# Patient Record
Sex: FEMALE | Race: ASIAN | NOT HISPANIC OR LATINO
[De-identification: names, ages, dates, MRNs, and addresses within clinical notes are randomized per-mention and may not be internally consistent; named-entity substitution may affect disease eponyms.]

---

## 2021-09-21 ENCOUNTER — NON-APPOINTMENT (OUTPATIENT)
Age: 56
End: 2021-09-21

## 2021-11-18 PROBLEM — Z00.00 ENCOUNTER FOR PREVENTIVE HEALTH EXAMINATION: Status: ACTIVE | Noted: 2021-11-18

## 2021-12-03 ENCOUNTER — APPOINTMENT (OUTPATIENT)
Dept: ENDOCRINOLOGY | Facility: CLINIC | Age: 56
End: 2021-12-03
Payer: MEDICAID

## 2021-12-03 VITALS
SYSTOLIC BLOOD PRESSURE: 125 MMHG | DIASTOLIC BLOOD PRESSURE: 72 MMHG | WEIGHT: 91 LBS | HEIGHT: 62 IN | HEART RATE: 80 BPM | BODY MASS INDEX: 16.75 KG/M2

## 2021-12-03 DIAGNOSIS — Z83.511 FAMILY HISTORY OF GLAUCOMA: ICD-10-CM

## 2021-12-03 DIAGNOSIS — H40.9 UNSPECIFIED GLAUCOMA: ICD-10-CM

## 2021-12-03 PROCEDURE — 99204 OFFICE O/P NEW MOD 45 MIN: CPT

## 2021-12-04 ENCOUNTER — TRANSCRIPTION ENCOUNTER (OUTPATIENT)
Age: 56
End: 2021-12-04

## 2021-12-06 ENCOUNTER — APPOINTMENT (OUTPATIENT)
Dept: ULTRASOUND IMAGING | Facility: HOSPITAL | Age: 56
End: 2021-12-06

## 2021-12-06 ENCOUNTER — OUTPATIENT (OUTPATIENT)
Dept: OUTPATIENT SERVICES | Facility: HOSPITAL | Age: 56
LOS: 1 days | End: 2021-12-06
Payer: COMMERCIAL

## 2021-12-06 ENCOUNTER — RESULT REVIEW (OUTPATIENT)
Age: 56
End: 2021-12-06

## 2021-12-06 LAB
25(OH)D3 SERPL-MCNC: 41.5 NG/ML
ALBUMIN SERPL ELPH-MCNC: 4.7 G/DL
ALP BLD-CCNC: 83 U/L
ALT SERPL-CCNC: 58 U/L
ANION GAP SERPL CALC-SCNC: 14 MMOL/L
AST SERPL-CCNC: 59 U/L
BASOPHILS # BLD AUTO: 0.06 K/UL
BASOPHILS NFR BLD AUTO: 0.8 %
BILIRUB SERPL-MCNC: 0.2 MG/DL
BUN SERPL-MCNC: 24 MG/DL
CALCIUM SERPL-MCNC: 9.8 MG/DL
CALCIUM SERPL-MCNC: 9.8 MG/DL
CHLORIDE SERPL-SCNC: 100 MMOL/L
CHOLEST SERPL-MCNC: 163 MG/DL
CO2 SERPL-SCNC: 25 MMOL/L
CREAT SERPL-MCNC: 0.72 MG/DL
EOSINOPHIL # BLD AUTO: 0.07 K/UL
EOSINOPHIL NFR BLD AUTO: 1 %
ESTIMATED AVERAGE GLUCOSE: 108 MG/DL
GLUCOSE SERPL-MCNC: 101 MG/DL
HBA1C MFR BLD HPLC: 5.4 %
HCT VFR BLD CALC: 41.2 %
HDLC SERPL-MCNC: 69 MG/DL
HGB BLD-MCNC: 12.8 G/DL
IMM GRANULOCYTES NFR BLD AUTO: 0.4 %
LDLC SERPL CALC-MCNC: 76 MG/DL
LYMPHOCYTES # BLD AUTO: 1.93 K/UL
LYMPHOCYTES NFR BLD AUTO: 26.5 %
MAGNESIUM SERPL-MCNC: 2.2 MG/DL
MAN DIFF?: NORMAL
MCHC RBC-ENTMCNC: 30.3 PG
MCHC RBC-ENTMCNC: 31.1 GM/DL
MCV RBC AUTO: 97.4 FL
MONOCYTES # BLD AUTO: 0.43 K/UL
MONOCYTES NFR BLD AUTO: 5.9 %
NEUTROPHILS # BLD AUTO: 4.75 K/UL
NEUTROPHILS NFR BLD AUTO: 65.4 %
NONHDLC SERPL-MCNC: 95 MG/DL
PARATHYROID HORMONE INTACT: 17 PG/ML
PHOSPHATE SERPL-MCNC: 4.1 MG/DL
PLATELET # BLD AUTO: 338 K/UL
POTASSIUM SERPL-SCNC: 5 MMOL/L
PROT SERPL-MCNC: 8 G/DL
RBC # BLD: 4.23 M/UL
RBC # FLD: 12.8 %
SODIUM SERPL-SCNC: 139 MMOL/L
T4 FREE SERPL-MCNC: 1.6 NG/DL
THYROGLOB AB SERPL-ACNC: <20 IU/ML
THYROGLOB SERPL-MCNC: <0.2 NG/ML
TRIGL SERPL-MCNC: 94 MG/DL
TSH SERPL-ACNC: 0.89 UIU/ML
VIT B12 SERPL-MCNC: 1567 PG/ML
WBC # FLD AUTO: 7.27 K/UL

## 2021-12-06 PROCEDURE — 76536 US EXAM OF HEAD AND NECK: CPT

## 2021-12-06 PROCEDURE — 76536 US EXAM OF HEAD AND NECK: CPT | Mod: 26

## 2022-02-02 LAB
ALBUMIN SERPL ELPH-MCNC: 4.8 G/DL
ALP BLD-CCNC: 80 U/L
ALT SERPL-CCNC: 67 U/L
ANION GAP SERPL CALC-SCNC: 13 MMOL/L
AST SERPL-CCNC: 53 U/L
BILIRUB SERPL-MCNC: 0.2 MG/DL
BUN SERPL-MCNC: 25 MG/DL
CALCIUM SERPL-MCNC: 9.7 MG/DL
CALCIUM SERPL-MCNC: 9.7 MG/DL
CHLORIDE SERPL-SCNC: 102 MMOL/L
CO2 SERPL-SCNC: 26 MMOL/L
CREAT SERPL-MCNC: 0.78 MG/DL
GLUCOSE SERPL-MCNC: 99 MG/DL
MAGNESIUM SERPL-MCNC: 2 MG/DL
PARATHYROID HORMONE INTACT: 36 PG/ML
PHOSPHATE SERPL-MCNC: 4.7 MG/DL
POTASSIUM SERPL-SCNC: 5.3 MMOL/L
PROT SERPL-MCNC: 7.8 G/DL
SODIUM SERPL-SCNC: 141 MMOL/L
TSH SERPL-ACNC: 0.3 UIU/ML

## 2022-02-02 NOTE — ADDENDUM
[FreeTextEntry1] : Recent laboratory results as below. AST and ALT borderline elevated and recommend follow-up with primary care. TSH within range; we will plan to adjust levothyroxine to 88 mcg daily and repeat TSH in 8 weeks. Thyroglobulin undetectable with negative antibodies, which is reassuring. Serum calcium within range and can discontinue alfacalcidol for now, with plan for repeat calciotropic panel with next blood tests. I advised she call me immediately with any symptoms of hypocalcemia, including perioral or extremity numbness/tingling. Lipid panel within range and can continue rosuvastatin 5 mg daily. Vitamin B12 borderline elevated and recommended decrease in supplementation. Other test results within range. 12/06/21\par \par Recent neck ultrasound as below, without evidence of residual or recurrent disease; discussed with Ms. Marie. 12/06/21\par \par Recent laboratory results as below; discussed with Ms. Marie. Serum calcium and PTH within range. Phosphorus elevated, likely due to dehydration. We can repeat with next blood tests. TSH at the lower end of normal and recommend adjustment in levothyroxine from 88 mcg daily to 88 mcg, 1 pill 6 days/week and 0.5 pill 1 day/week (average daily dose 82 mcg). I advised she make an appointment to see me in May 2022, with interval blood tests at that time. 2/02/22

## 2022-02-02 NOTE — PHYSICAL EXAM
[Alert] : alert [Healthy Appearance] : healthy appearance [No Acute Distress] : no acute distress [Normal Sclera/Conjunctiva] : normal sclera/conjunctiva [Normal Hearing] : hearing was normal [No Neck Mass] : no neck mass was observed [No LAD] : no lymphadenopathy [Supple] : the neck was supple [Well Healed Scar] : well healed scar [No Respiratory Distress] : no respiratory distress [Clear to Auscultation] : lungs were clear to auscultation bilaterally [Normal S1, S2] : normal S1 and S2 [Normal Rate] : heart rate was normal [Regular Rhythm] : with a regular rhythm [No Stigmata of Cushings Syndrome] : no stigmata of Cushings Syndrome [Normal Gait] : normal gait [Normal Insight/Judgement] : insight and judgment were intact [Kyphosis] : no kyphosis present [Acanthosis Nigricans] : no acanthosis nigricans [de-identified] : no palpable thyroid tissue [de-identified] : no moon facies, no supraclavicular fat pads

## 2022-02-02 NOTE — ASSESSMENT
[FreeTextEntry1] : Thyroid cancer. Stage 1 due to age and absence of metastatic disease per her report; pathology not available. She is status post total thyroidectomy in October 2017. She was treated with radioactive iodine postoperatively, unknown dose. She has had an excellent response to therapy per her report. She is taking levothyroxine at bedtime and can continue if her thyroid stimulating hormone level remains within range.\par Consider adjustment in levothyroxine to 88 mcg daily pending TSH for goal 0.5-2.0 uIU/mL\par Check thyroglobulin and antibodies\par Interval neck ultrasound\par \par Postoperative hypocalcemia. She has been taking alfacalcidol 0.5 mcg daily since her thyroid surgery due to immediate postoperative hypocalcemia, approximately equivalent to calcitriol 0.25 mcg. She was not informed of a diagnosis of hypoparathyroidism. No history of kidney stone or fracture.\par Check calciotropic panel; consider discontinuation of active vitamin D therapy if serum calcium is robust\par \par Hyperlipidemia. She has been taking rosuvastatin 5 mg daily and tolerating well. No personal or family history of heart disease.\par Continue rosuvastatin 5 mg daily pending lipid panel\par \par I referred her to primary care providers within the [facility] system.\par \par Return to see me in 6 months or earlier as needed.

## 2022-02-02 NOTE — HISTORY OF PRESENT ILLNESS
[FreeTextEntry1] : Ms. Marie is a 56 year-old woman with a history of thyroid cancer, hypocalcemia, and hyperlipidemia presenting to establish care with me. \par \par Thyroid cancer. Stage 1 due to age and absence of metastatic disease per her report; pathology not available. \par She is status post total thyroidectomy in October 2017. \par She was treated with radioactive iodine postoperatively, perhaps 30 mCi.\par Neck ultrasound monitoring in [place] has been without evidence of residual or recurrent disease per her report.\par She has been taking levothyroxine 50 mcg, 2 tablets 5 days/week and 1 tablet 2 days/week (average daily dose 86 mcg).\par She takes levothyroxine before bed.\par No history of external beam radiation exposure.\par Mother with history of hyperthyroidism with subsequent postablation hypothyroidism. No family history of thyroid cancer or other endocrine tumors.\par \par Postoperative hypocalcemia.\par She has been taking alfacalcidol 0.5 mcg daily since her thyroid surgery due to immediate postoperative hypocalcemia, approximately equivalent to calcitriol 0.25 mcg. \par She was not informed of a diagnosis of hypoparathyroidism.\par No history of kidney stone or fracture.\par She has yogurt, cottage cheese, and milk daily. \par No calcium supplements, multivitamin, or vitamin D supplements.\par \par Hyperlipidemia.\par She has been taking rosuvastatin 5 mg daily since 2019.\par She is tolerating therapy well.\par No personal or family history of heart disease.\par \par She used to travel back and forth to [facility] for medical care but has not been able to travel during the pandemic.

## 2022-05-03 ENCOUNTER — APPOINTMENT (OUTPATIENT)
Dept: ENDOCRINOLOGY | Facility: CLINIC | Age: 57
End: 2022-05-03
Payer: MEDICAID

## 2022-05-03 VITALS
SYSTOLIC BLOOD PRESSURE: 98 MMHG | BODY MASS INDEX: 17.85 KG/M2 | HEIGHT: 62 IN | HEART RATE: 75 BPM | DIASTOLIC BLOOD PRESSURE: 63 MMHG | WEIGHT: 97 LBS

## 2022-05-03 PROCEDURE — 99214 OFFICE O/P EST MOD 30 MIN: CPT

## 2022-05-04 ENCOUNTER — TRANSCRIPTION ENCOUNTER (OUTPATIENT)
Age: 57
End: 2022-05-04

## 2022-05-04 ENCOUNTER — NON-APPOINTMENT (OUTPATIENT)
Age: 57
End: 2022-05-04

## 2022-05-04 LAB
25(OH)D3 SERPL-MCNC: 27.4 NG/ML
ALBUMIN SERPL ELPH-MCNC: 5.1 G/DL
ALP BLD-CCNC: 77 U/L
ALT SERPL-CCNC: 78 U/L
ANION GAP SERPL CALC-SCNC: 12 MMOL/L
AST SERPL-CCNC: 61 U/L
BASOPHILS # BLD AUTO: 0.05 K/UL
BASOPHILS NFR BLD AUTO: 0.6 %
BILIRUB SERPL-MCNC: 0.2 MG/DL
BUN SERPL-MCNC: 25 MG/DL
CALCIUM SERPL-MCNC: 9.8 MG/DL
CALCIUM SERPL-MCNC: 9.8 MG/DL
CHLORIDE SERPL-SCNC: 101 MMOL/L
CHOLEST SERPL-MCNC: 171 MG/DL
CO2 SERPL-SCNC: 27 MMOL/L
CREAT SERPL-MCNC: 0.7 MG/DL
EGFR: 101 ML/MIN/1.73M2
EOSINOPHIL # BLD AUTO: 0.09 K/UL
EOSINOPHIL NFR BLD AUTO: 1.1 %
GLUCOSE SERPL-MCNC: 85 MG/DL
HCT VFR BLD CALC: 40.6 %
HDLC SERPL-MCNC: 76 MG/DL
HGB BLD-MCNC: 12.6 G/DL
IMM GRANULOCYTES NFR BLD AUTO: 0.4 %
LDLC SERPL CALC-MCNC: 70 MG/DL
LYMPHOCYTES # BLD AUTO: 2.02 K/UL
LYMPHOCYTES NFR BLD AUTO: 25.6 %
MAGNESIUM SERPL-MCNC: 2 MG/DL
MAN DIFF?: NORMAL
MCHC RBC-ENTMCNC: 29.6 PG
MCHC RBC-ENTMCNC: 31 GM/DL
MCV RBC AUTO: 95.3 FL
MONOCYTES # BLD AUTO: 0.55 K/UL
MONOCYTES NFR BLD AUTO: 7 %
NEUTROPHILS # BLD AUTO: 5.15 K/UL
NEUTROPHILS NFR BLD AUTO: 65.3 %
NONHDLC SERPL-MCNC: 95 MG/DL
PARATHYROID HORMONE INTACT: 23 PG/ML
PHOSPHATE SERPL-MCNC: 4.4 MG/DL
PLATELET # BLD AUTO: 283 K/UL
POTASSIUM SERPL-SCNC: 4.7 MMOL/L
PROT SERPL-MCNC: 7.8 G/DL
RBC # BLD: 4.26 M/UL
RBC # FLD: 13.2 %
SODIUM SERPL-SCNC: 140 MMOL/L
TRIGL SERPL-MCNC: 125 MG/DL
TSH SERPL-ACNC: 0.42 UIU/ML
WBC # FLD AUTO: 7.89 K/UL

## 2022-05-04 NOTE — ADDENDUM
[FreeTextEntry1] : Recent laboratory results as below; discussed with Ms. Marie. Blood urea nitrogen and albumin borderline elevated; recommended hydration. AST and ALT borderline elevated as per previous and recommend referral to a gastroenterologist. TSH within range. Lipid panel around goal. Vitamin D below goal and recommend an over-the-counter supplement with 1000 intl units daily. Other test results within range. 5/04/22

## 2022-05-04 NOTE — ASSESSMENT
[FreeTextEntry1] : Thyroid cancer. Stage 1 due to age and absence of metastatic disease per her report; pathology not available. She is status post total thyroidectomy in October 2017. She was treated with radioactive iodine postoperatively, unknown dose. She has had an excellent response to therapy. Thyroglobulin undetectable with negative antibodies in December 2021. Neck ultrasound in December 2021 without evidence of residual or recurrent disease. \par Consider levothyroxine 88 mcg, 1 pill 6 days/week and 0.5 pill 1 day/week pending TSH for goal 0.5-2.0 uIU/mL\par Monitor thyroglobulin and antibodies in December 2022\par Interval neck ultrasound in December 2022\par \par Postoperative hypocalcemia. She had been taking alfacalcidol 0.5 mcg daily since her thyroid surgery due to immediate postoperative hypocalcemia, approximately equivalent to calcitriol 0.25 mcg; we discontinued therapy in December 2021. No history of kidney stone or fracture. No symptoms of hypocalcemia.\par Monitor calciotropic panel\par \par Hyperlipidemia. She has been taking rosuvastatin 5 mg daily and tolerating well. No personal or family history of heart disease.\par Continue rosuvastatin 5 mg daily pending lipid panel\par \par I referred her to primary care providers within the [facility] system.\par \par Return to see me in 6 months or earlier as needed.

## 2022-05-04 NOTE — PHYSICAL EXAM
[Alert] : alert [Healthy Appearance] : healthy appearance [No Acute Distress] : no acute distress [Normal Sclera/Conjunctiva] : normal sclera/conjunctiva [Normal Hearing] : hearing was normal [No Neck Mass] : no neck mass was observed [No LAD] : no lymphadenopathy [Supple] : the neck was supple [Well Healed Scar] : well healed scar [No Respiratory Distress] : no respiratory distress [Clear to Auscultation] : lungs were clear to auscultation bilaterally [Normal S1, S2] : normal S1 and S2 [Normal Rate] : heart rate was normal [Regular Rhythm] : with a regular rhythm [No Stigmata of Cushings Syndrome] : no stigmata of Cushings Syndrome [Normal Gait] : normal gait [Normal Insight/Judgement] : insight and judgment were intact [Kyphosis] : no kyphosis present [Acanthosis Nigricans] : no acanthosis nigricans [de-identified] : no palpable thyroid tissue [de-identified] : no moon facies, no supraclavicular fat pads

## 2022-05-04 NOTE — HISTORY OF PRESENT ILLNESS
[FreeTextEntry1] : Ms. Marie is a 56 year-old woman with a history of thyroid cancer, hypocalcemia, and hyperlipidemia presenting for follow-up of her endocrine issues. I saw her for an initial visit in December 2021.\par \par Thyroid cancer. Stage 1 due to age and absence of metastatic disease per her report; pathology not available. \par She is status post total thyroidectomy in October 2017. \par She was treated with radioactive iodine postoperatively, perhaps 30 mCi.\par Neck ultrasound monitoring in [place] has been without evidence of residual or recurrent disease per her report. \par Thyroglobulin undetectable with negative antibodies in December 2021. Neck ultrasound in December 2021 without evidence of residual or recurrent disease\par She had been taking levothyroxine 50 mcg, 2 tablets 5 days/week and 1 tablet 2 days/week (average daily dose 86 mcg); we have adjusted her regimen of levothyroxine to 88 mcg, 1 pill 6 days/week and 0.5 pill 1 day/week (average daily dose 82 mcg).\par She takes levothyroxine before bed.\par No history of external beam radiation exposure.\par Mother with history of hyperthyroidism with subsequent postablation hypothyroidism. No family history of thyroid cancer or other endocrine tumors.\par \par Postoperative hypocalcemia.\par She has been taking alfacalcidol 0.5 mcg daily since her thyroid surgery due to immediate postoperative hypocalcemia, approximately equivalent to calcitriol 0.25 mcg. She was not informed of a diagnosis of hypoparathyroidism. We discontinued active vitamin D therapy in December 2021. \par No history of kidney stone or fracture.\par She has yogurt, cottage cheese, and milk daily. \par No calcium supplements, multivitamin, or vitamin D supplements.\par \par Hyperlipidemia.\par She has been taking rosuvastatin 5 mg daily since 2019.\par She is tolerating therapy well.\par No personal or family history of heart disease.\par \par Interim History \par Laboratory results from last visit as below. AST and ALT borderline elevated and recommend follow-up with primary care. TSH within range; we adjusted levothyroxine to 88 mcg daily. Thyroglobulin undetectable with negative antibodies, which was reassuring. Serum calcium within range and we discontinued alfacalcidol. Lipid panel within range. Vitamin B12 borderline elevated and recommended decrease in supplementation. Other test results within range. \par Neck ultrasound from December as below, without evidence of residual or recurrent disease.\par Laboratory results from February as below. Serum calcium and PTH within range. Phosphorus elevated, likely due to dehydration. TSH at the lower end of normal and recommended adjustment in levothyroxine from 88 mcg daily to 88 mcg, 1 pill 6 days/week and 0.5 pill 1 day/week (average daily dose 82 mcg). \par She feels well today. No palpitations, diarrhea/constipation. No perioral/extremity numbness/tingling. \par Medical and surgical history, medications, allergies, social and family history reviewed and updated as needed.

## 2022-06-14 ENCOUNTER — APPOINTMENT (OUTPATIENT)
Age: 57
End: 2022-06-14
Payer: MEDICAID

## 2022-06-14 ENCOUNTER — LABORATORY RESULT (OUTPATIENT)
Age: 57
End: 2022-06-14

## 2022-06-14 VITALS
SYSTOLIC BLOOD PRESSURE: 110 MMHG | HEART RATE: 78 BPM | HEIGHT: 62 IN | BODY MASS INDEX: 16.93 KG/M2 | DIASTOLIC BLOOD PRESSURE: 70 MMHG | WEIGHT: 92 LBS | TEMPERATURE: 98.1 F | RESPIRATION RATE: 16 BRPM | OXYGEN SATURATION: 98 %

## 2022-06-14 DIAGNOSIS — R79.89 OTHER SPECIFIED ABNORMAL FINDINGS OF BLOOD CHEMISTRY: ICD-10-CM

## 2022-06-14 PROCEDURE — 99204 OFFICE O/P NEW MOD 45 MIN: CPT

## 2022-06-14 NOTE — PHYSICAL EXAM
[General Appearance - Alert] : alert [Sclera] : the sclera and conjunctiva were normal [Outer Ear] : the ears and nose were normal in appearance [Neck Appearance] : the appearance of the neck was normal [Abdomen Soft] : soft [Abnormal Walk] : normal gait [Skin Color & Pigmentation] : normal skin color and pigmentation [Cranial Nerves] : cranial nerves 2-12 were intact [Oriented To Time, Place, And Person] : oriented to person, place, and time

## 2022-06-15 ENCOUNTER — TRANSCRIPTION ENCOUNTER (OUTPATIENT)
Age: 57
End: 2022-06-15

## 2022-06-15 LAB
CERULOPLASMIN SERPL-MCNC: 23 MG/DL
ENDOMYSIUM IGA SER QL: NEGATIVE
ENDOMYSIUM IGA TITR SER: NORMAL
IGG SER QL IEP: 1464 MG/DL
SMOOTH MUSCLE AB SER QL IF: NORMAL
TSH SERPL-ACNC: 0.24 UIU/ML

## 2022-06-15 NOTE — HISTORY OF PRESENT ILLNESS
[de-identified] : Ms. Marie is a 56 year-old woman with a history of thyroid cancer dx 2016 and complete thyroidectomy 2017 in [facility], hypocalcemia and hyperlipidemia presents to clinic for elevated LFTs. 5/3/22 AST 61, ALT 78.  2/1/22 AST 53, ALT 67. Pt reports feeling well. Denies any abdominal discomfort nor pain, constipation  nor diarrhea. Pt reports she never had a colonoscopy.  Last FIT test was negative for the last 3 years.\par \par FH- Father had bile duct cancer\par Diet- low in fat- meat, chicken eggs, well balanced diet\par Exercise- Daily; elliptical\par Does not smoke, drink nor use drugs\par

## 2022-06-15 NOTE — ASSESSMENT
[FreeTextEntry1] : Elevated LFT's\par -Abdominal US ordered\par - Liver labs drawn including: GAVI, ASMA, IGG, Hep B, Hep C, celiac disease\par - Maintain low fat diet\par - Avoid alcohol\par - Continue daily exercise\par \par Average risk index screening colonoscopy\par -Schedule colonoscopy [facility]\par -Bowel prep provided\par -r/b/a/i discussed and patient agreeable\par -Details of procedure, including risks of procedure which include but not limited to bleeding, perforation, infection, missed polyp discussed and patient gives verbal consent. Written consent to be obtained at time of procedure.\par -Pt was advised that an escort is needed to  from procedure\par -Will f/u post procedure\par

## 2022-06-16 ENCOUNTER — TRANSCRIPTION ENCOUNTER (OUTPATIENT)
Age: 57
End: 2022-06-16

## 2022-06-16 LAB
ANA PAT FLD IF-IMP: ABNORMAL
ANA SER IF-ACNC: ABNORMAL
HBV CORE IGG+IGM SER QL: REACTIVE
HBV SURFACE AB SER QL: REACTIVE
HBV SURFACE AG SER QL: NONREACTIVE
HCV AB SER QL: NONREACTIVE
HCV S/CO RATIO: 0.25 S/CO

## 2022-06-17 ENCOUNTER — TRANSCRIPTION ENCOUNTER (OUTPATIENT)
Age: 57
End: 2022-06-17

## 2022-06-17 LAB
GLIADIN IGA SER QL: <5 UNITS
GLIADIN IGG SER QL: <5 UNITS
GLIADIN PEPTIDE IGA SER-ACNC: NEGATIVE
GLIADIN PEPTIDE IGG SER-ACNC: NEGATIVE
TTG IGA SER IA-ACNC: <1.2 U/ML
TTG IGA SER-ACNC: NEGATIVE
TTG IGG SER IA-ACNC: 1.2 U/ML
TTG IGG SER IA-ACNC: NEGATIVE

## 2022-06-20 ENCOUNTER — OUTPATIENT (OUTPATIENT)
Dept: OUTPATIENT SERVICES | Facility: HOSPITAL | Age: 57
LOS: 1 days | End: 2022-06-20

## 2022-06-20 ENCOUNTER — RESULT REVIEW (OUTPATIENT)
Age: 57
End: 2022-06-20

## 2022-06-20 ENCOUNTER — APPOINTMENT (OUTPATIENT)
Dept: ULTRASOUND IMAGING | Facility: CLINIC | Age: 57
End: 2022-06-20
Payer: MEDICAID

## 2022-06-20 PROCEDURE — 76700 US EXAM ABDOM COMPLETE: CPT | Mod: 26

## 2022-06-21 ENCOUNTER — TRANSCRIPTION ENCOUNTER (OUTPATIENT)
Age: 57
End: 2022-06-21

## 2022-06-22 ENCOUNTER — TRANSCRIPTION ENCOUNTER (OUTPATIENT)
Age: 57
End: 2022-06-22

## 2022-06-29 LAB
ANNOTATION COMMENT IMP: NORMAL
HLA-DQ2: NEGATIVE
HLA-DQ8 QL: POSITIVE
REF LAB TEST METHOD: NORMAL

## 2022-07-25 ENCOUNTER — APPOINTMENT (OUTPATIENT)
Age: 57
End: 2022-07-25

## 2022-08-17 ENCOUNTER — TRANSCRIPTION ENCOUNTER (OUTPATIENT)
Age: 57
End: 2022-08-17

## 2022-08-18 ENCOUNTER — APPOINTMENT (OUTPATIENT)
Age: 57
End: 2022-08-18

## 2022-08-18 PROCEDURE — 45378 DIAGNOSTIC COLONOSCOPY: CPT | Mod: 33

## 2022-09-14 ENCOUNTER — NON-APPOINTMENT (OUTPATIENT)
Age: 57
End: 2022-09-14

## 2022-09-20 ENCOUNTER — TRANSCRIPTION ENCOUNTER (OUTPATIENT)
Age: 57
End: 2022-09-20

## 2022-09-20 RX ORDER — CALCITRIOL 0.5 UG/1
CAPSULE, LIQUID FILLED ORAL
Refills: 0 | Status: DISCONTINUED | COMMUNITY
End: 2022-09-20

## 2022-10-04 ENCOUNTER — NON-APPOINTMENT (OUTPATIENT)
Age: 57
End: 2022-10-04

## 2022-10-04 ENCOUNTER — APPOINTMENT (OUTPATIENT)
Dept: INTERNAL MEDICINE | Facility: CLINIC | Age: 57
End: 2022-10-04

## 2022-10-04 VITALS
OXYGEN SATURATION: 98 % | WEIGHT: 94 LBS | BODY MASS INDEX: 17.3 KG/M2 | DIASTOLIC BLOOD PRESSURE: 66 MMHG | SYSTOLIC BLOOD PRESSURE: 103 MMHG | HEIGHT: 62 IN | TEMPERATURE: 98.6 F | HEART RATE: 80 BPM

## 2022-10-04 DIAGNOSIS — R92.2 INCONCLUSIVE MAMMOGRAM: ICD-10-CM

## 2022-10-04 DIAGNOSIS — H26.9 UNSPECIFIED CATARACT: ICD-10-CM

## 2022-10-04 PROCEDURE — 99203 OFFICE O/P NEW LOW 30 MIN: CPT

## 2022-10-04 RX ORDER — DORZOLAMIDE HYDROCHLORIDE AND TIMOLOL MALEATE 20; 5 MG/ML; MG/ML
SOLUTION/ DROPS OPHTHALMIC
Refills: 0 | Status: COMPLETED | COMMUNITY
End: 2022-10-04

## 2022-10-04 RX ORDER — LATANOPROST 50 UG/ML
SOLUTION OPHTHALMIC
Refills: 0 | Status: ACTIVE | COMMUNITY

## 2022-10-04 RX ORDER — CYCLOSPORINE 0.5 MG/ML
EMULSION OPHTHALMIC
Refills: 0 | Status: COMPLETED | COMMUNITY
End: 2022-10-04

## 2022-10-04 NOTE — HISTORY OF PRESENT ILLNESS
[FreeTextEntry8] : est care\par - pt originally scheduled fo pre op, but had clearance performed already\par - kept appt to transition care to this office\par \par h/o right facial venous malformation\par \par h/o thyroid ca\par - s/p total thyroidectom\par - follows w/ endo\par - is on levothyroxine\par \par recently had elevated LFTs\par - w/u from GI did not show any clear indication\par - c scope performed was normal\par - labs found elevated GAVI 1:320, pt denies any arthritic or joint pain sx.

## 2022-10-17 ENCOUNTER — TRANSCRIPTION ENCOUNTER (OUTPATIENT)
Age: 57
End: 2022-10-17

## 2022-11-09 ENCOUNTER — APPOINTMENT (OUTPATIENT)
Dept: RHEUMATOLOGY | Facility: CLINIC | Age: 57
End: 2022-11-09

## 2022-11-09 VITALS
BODY MASS INDEX: 16.75 KG/M2 | OXYGEN SATURATION: 97 % | HEART RATE: 74 BPM | TEMPERATURE: 97.4 F | WEIGHT: 91 LBS | DIASTOLIC BLOOD PRESSURE: 63 MMHG | SYSTOLIC BLOOD PRESSURE: 100 MMHG | HEIGHT: 62 IN

## 2022-11-09 DIAGNOSIS — R76.8 OTHER SPECIFIED ABNORMAL IMMUNOLOGICAL FINDINGS IN SERUM: ICD-10-CM

## 2022-11-09 PROCEDURE — 36415 COLL VENOUS BLD VENIPUNCTURE: CPT

## 2022-11-09 PROCEDURE — 99204 OFFICE O/P NEW MOD 45 MIN: CPT | Mod: 25

## 2022-11-09 NOTE — DATA REVIEWED
[FreeTextEntry1] : Labs noted regarding GAVI she is also core antibody positive suggestive of prior hepatitis B infection

## 2022-11-09 NOTE — PHYSICAL EXAM
[General Appearance - Alert] : alert [General Appearance - In No Acute Distress] : in no acute distress [General Appearance - Well Nourished] : well nourished [General Appearance - Well Developed] : well developed [Sclera] : the sclera and conjunctiva were normal [PERRL With Normal Accommodation] : pupils were equal in size, round, and reactive to light [Auscultation Breath Sounds / Voice Sounds] : lungs were clear to auscultation bilaterally [Edema] : there was no peripheral edema [Abnormal Walk] : normal gait [Nail Clubbing] : no clubbing  or cyanosis of the fingernails [Musculoskeletal - Swelling] : no joint swelling seen [Motor Tone] : muscle strength and tone were normal [] : no rash [Motor Exam] : the motor exam was normal [FreeTextEntry1] : No muscle weakness no muscle tenderness

## 2022-11-09 NOTE — HISTORY OF PRESENT ILLNESS
[FreeTextEntry1] : This is a 56-year-old woman she has been found to have a positive GAVI this was done as part of an evaluation for elevated AST ALT she does have laboratory studies which are available for me to review she reports that she has been asymptomatic she has no joint pains no muscle pains no rashes no signs or symptoms of any systemic rheumatic disease no dry eyes no dry mouth she did have an elevated ALT and AST this has been followed up with a number of different studies and her GAVI is positive she does take a statin but denies any significant muscle pains or weakness she is also on thyroid replacement therapy but apparently she has had thyroid cancer with thyroidectomy she is now on replacement for this

## 2022-11-09 NOTE — REVIEW OF SYSTEMS
[Fever] : no fever [Chills] : no chills [Feeling Poorly] : not feeling poorly [Feeling Tired] : not feeling tired [Dry Eyes] : no dryness of the eyes [Shortness Of Breath] : no shortness of breath [Cough] : no cough [Joint Pain] : no joint pain [Joint Swelling] : no joint swelling [Joint Stiffness] : no joint stiffness

## 2022-11-09 NOTE — ASSESSMENT
[FreeTextEntry1] : This is a 56-year-old woman she has been referred because of a positive GAVI this was done because of abnormal liver studies ALT and AST she is asymptomatic there is no suggestion of any systemic rheumatic disease she is on thyroid replacement however this is status post thyroidectomy for thyroid cancer not suggestive of autoimmune thyroid issues she is on a statin and I am checking a CPK to ensure that it is not related to -muscle as opposed to liver I am also checking an avise - ctd -which can identify specific markers associate with other autoimmune disease also of note she is hepatitis B core antibody positive suggesting prior hepatitis B infection which may be accounting for prior GAVI positivity

## 2022-11-10 LAB — CK SERPL-CCNC: 88 U/L

## 2022-11-28 ENCOUNTER — NON-APPOINTMENT (OUTPATIENT)
Age: 57
End: 2022-11-28

## 2022-11-29 LAB — AVISE CTD: NORMAL

## 2023-01-22 ENCOUNTER — TRANSCRIPTION ENCOUNTER (OUTPATIENT)
Age: 58
End: 2023-01-22

## 2023-01-24 ENCOUNTER — LABORATORY RESULT (OUTPATIENT)
Age: 58
End: 2023-01-24

## 2023-01-24 ENCOUNTER — APPOINTMENT (OUTPATIENT)
Dept: ENDOCRINOLOGY | Facility: CLINIC | Age: 58
End: 2023-01-24
Payer: MEDICAID

## 2023-01-24 VITALS
SYSTOLIC BLOOD PRESSURE: 92 MMHG | DIASTOLIC BLOOD PRESSURE: 57 MMHG | BODY MASS INDEX: 17.11 KG/M2 | HEIGHT: 62 IN | WEIGHT: 93 LBS | HEART RATE: 79 BPM

## 2023-01-24 DIAGNOSIS — E83.51 HYPOCALCEMIA: ICD-10-CM

## 2023-01-24 PROCEDURE — 99214 OFFICE O/P EST MOD 30 MIN: CPT

## 2023-01-25 ENCOUNTER — NON-APPOINTMENT (OUTPATIENT)
Age: 58
End: 2023-01-25

## 2023-01-25 LAB
25(OH)D3 SERPL-MCNC: 26.3 NG/ML
ALBUMIN SERPL ELPH-MCNC: 4.6 G/DL
ALP BLD-CCNC: 63 U/L
ALT SERPL-CCNC: 23 U/L
ANION GAP SERPL CALC-SCNC: 10 MMOL/L
AST SERPL-CCNC: 29 U/L
BASOPHILS # BLD AUTO: 0.06 K/UL
BASOPHILS NFR BLD AUTO: 0.8 %
BILIRUB SERPL-MCNC: 0.2 MG/DL
BUN SERPL-MCNC: 30 MG/DL
CALCIUM SERPL-MCNC: 9.9 MG/DL
CALCIUM SERPL-MCNC: 9.9 MG/DL
CHLORIDE SERPL-SCNC: 102 MMOL/L
CHOLEST SERPL-MCNC: 176 MG/DL
CO2 SERPL-SCNC: 28 MMOL/L
CREAT SERPL-MCNC: 0.93 MG/DL
EGFR: 72 ML/MIN/1.73M2
EOSINOPHIL # BLD AUTO: 0.05 K/UL
EOSINOPHIL NFR BLD AUTO: 0.7 %
GLUCOSE SERPL-MCNC: 105 MG/DL
HCT VFR BLD CALC: 38.5 %
HDLC SERPL-MCNC: 78 MG/DL
HGB BLD-MCNC: 12.2 G/DL
IMM GRANULOCYTES NFR BLD AUTO: 0.3 %
LDLC SERPL CALC-MCNC: 84 MG/DL
LYMPHOCYTES # BLD AUTO: 1.71 K/UL
LYMPHOCYTES NFR BLD AUTO: 23.5 %
MAGNESIUM SERPL-MCNC: 1.8 MG/DL
MAN DIFF?: NORMAL
MCHC RBC-ENTMCNC: 29.5 PG
MCHC RBC-ENTMCNC: 31.7 GM/DL
MCV RBC AUTO: 93 FL
MONOCYTES # BLD AUTO: 0.41 K/UL
MONOCYTES NFR BLD AUTO: 5.6 %
NEUTROPHILS # BLD AUTO: 5.02 K/UL
NEUTROPHILS NFR BLD AUTO: 69.1 %
NONHDLC SERPL-MCNC: 98 MG/DL
PARATHYROID HORMONE INTACT: 28 PG/ML
PHOSPHATE SERPL-MCNC: 5 MG/DL
PLATELET # BLD AUTO: 253 K/UL
POTASSIUM SERPL-SCNC: 4.8 MMOL/L
PROT SERPL-MCNC: 7.4 G/DL
RBC # BLD: 4.14 M/UL
RBC # FLD: 12.6 %
SODIUM SERPL-SCNC: 140 MMOL/L
THYROGLOB AB SERPL-ACNC: <20 IU/ML
THYROGLOB SERPL-MCNC: <0.2 NG/ML
TRIGL SERPL-MCNC: 67 MG/DL
TSH SERPL-ACNC: 0.16 UIU/ML
VIT B12 SERPL-MCNC: 1193 PG/ML
WBC # FLD AUTO: 7.27 K/UL

## 2023-01-26 ENCOUNTER — APPOINTMENT (OUTPATIENT)
Dept: ULTRASOUND IMAGING | Facility: HOSPITAL | Age: 58
End: 2023-01-26

## 2023-01-26 ENCOUNTER — OUTPATIENT (OUTPATIENT)
Dept: OUTPATIENT SERVICES | Facility: HOSPITAL | Age: 58
LOS: 1 days | End: 2023-01-26
Payer: COMMERCIAL

## 2023-01-26 PROCEDURE — 76536 US EXAM OF HEAD AND NECK: CPT

## 2023-01-26 PROCEDURE — 76536 US EXAM OF HEAD AND NECK: CPT | Mod: 26

## 2023-01-27 NOTE — ASSESSMENT
[FreeTextEntry1] : Thyroid cancer. Stage 1 due to age and absence of metastatic disease per her report; pathology not available. She is status post total thyroidectomy in October 2017. She was treated with radioactive iodine postoperatively, unknown dose. She has had an excellent response to therapy. Thyroglobulin undetectable with negative antibodies in December 2021. Neck ultrasound in December 2021 without evidence of residual or recurrent disease. \par Consider levothyroxine 88 mcg, 1 pill 6 days/week and 0.5 pill 1 day/week pending TSH for goal 0.5-2.0 uIU/mL; she will need refills pending results\par Monitor thyroglobulin and antibodies\par Interval neck ultrasound\par \par Postoperative hypocalcemia. She had been taking alfacalcidol 0.5 mcg daily since her thyroid surgery due to immediate postoperative hypocalcemia, approximately equivalent to calcitriol 0.25 mcg; we discontinued therapy in December 2021. No history of kidney stone or fracture. No symptoms of hypocalcemia.\par Monitor calciotropic panel\par \par Hyperlipidemia. She has been taking rosuvastatin 5 mg daily and tolerating well. No personal or family history of heart disease.\par Continue rosuvastatin 5 mg daily pending lipid panel\par \par Return to see me in 12 months or earlier as needed.

## 2023-01-27 NOTE — PHYSICAL EXAM
[Alert] : alert [Healthy Appearance] : healthy appearance [No Acute Distress] : no acute distress [Normal Sclera/Conjunctiva] : normal sclera/conjunctiva [Normal Hearing] : hearing was normal [No Neck Mass] : no neck mass was observed [No LAD] : no lymphadenopathy [Supple] : the neck was supple [Well Healed Scar] : well healed scar [No Respiratory Distress] : no respiratory distress [No Stigmata of Cushings Syndrome] : no stigmata of Cushings Syndrome [Normal Gait] : normal gait [Normal Insight/Judgement] : insight and judgment were intact [Kyphosis] : no kyphosis present [Acanthosis Nigricans] : no acanthosis nigricans [de-identified] : no palpable thyroid tissue [de-identified] : no moon facies, no supraclavicular fat pads

## 2023-01-27 NOTE — ADDENDUM
[FreeTextEntry1] : Recent laboratory results as below; discussed with Ms. Marie. TSH below goal and recommended adjustment in levothyroxine to 75 mcg daily. We will plan to repeat TSH in 8-12 weeks. Thyroglobulin undetectable with negative antibodies, which is reassuring. Blood urea nitrogen and phosphorus elevated; recommended hydration. Glucose not fasting. Vitamin D around goal; recommended 2000 intl units daily. Lipid panel and other test results within range. 1/25/23\par \par Neck ultrasound without evidence of residual or recurrent disease; discussed with Ms. Marie. 1/27/23

## 2023-01-27 NOTE — HISTORY OF PRESENT ILLNESS
[FreeTextEntry1] : Ms. Marie is a 57 year-old woman with a history of thyroid cancer, hypocalcemia, and hyperlipidemia presenting for follow-up of her endocrine issues. I saw her for an initial visit in December 2021 and last in May 2022.\par \par Thyroid cancer. Stage 1 due to age and absence of metastatic disease per her report; pathology not available. \par She is status post total thyroidectomy in October 2017. \par She was treated with radioactive iodine postoperatively, perhaps 30 mCi.\par Neck ultrasound monitoring in [place] has been without evidence of residual or recurrent disease per her report. \par Thyroglobulin undetectable with negative antibodies in December 2021. Neck ultrasound in December 2021 without evidence of residual or recurrent disease\par She had been taking levothyroxine 50 mcg, 2 tablets 5 days/week and 1 tablet 2 days/week (average daily dose 86 mcg); we have adjusted her regimen of levothyroxine to 88 mcg, 1 pill 6 days/week and 0.5 pill 1 day/week (average daily dose 82 mcg).\par She takes levothyroxine before bed.\par No history of external beam radiation exposure.\par Mother with history of hyperthyroidism with subsequent postablation hypothyroidism. No family history of thyroid cancer or other endocrine tumors.\par \par Postoperative hypocalcemia.\par She has been taking alfacalcidol 0.5 mcg daily since her thyroid surgery due to immediate postoperative hypocalcemia, approximately equivalent to calcitriol 0.25 mcg. She was not informed of a diagnosis of hypoparathyroidism. We discontinued active vitamin D therapy in December 2021. \par No history of kidney stone or fracture.\par She has yogurt, cottage cheese, and milk daily. \par She is taking vitamin D 1000 intl units daily. No calcium supplements or multivitamin.\par \par Hyperlipidemia.\par She has been taking rosuvastatin 5 mg daily since 2019.\par She is tolerating therapy well.\par No personal or family history of heart disease.\par \par Interim History \par She has seen multiple providers; notes reviewed. \par She feels well today. No acute issues.\par Medical and surgical history, medications, allergies, social and family history reviewed and updated as needed.

## 2023-09-18 ENCOUNTER — TRANSCRIPTION ENCOUNTER (OUTPATIENT)
Age: 58
End: 2023-09-18

## 2023-10-16 ENCOUNTER — TRANSCRIPTION ENCOUNTER (OUTPATIENT)
Age: 58
End: 2023-10-16

## 2024-01-31 ENCOUNTER — APPOINTMENT (OUTPATIENT)
Dept: ENDOCRINOLOGY | Facility: CLINIC | Age: 59
End: 2024-01-31
Payer: MEDICAID

## 2024-01-31 VITALS
HEART RATE: 76 BPM | SYSTOLIC BLOOD PRESSURE: 111 MMHG | WEIGHT: 97 LBS | DIASTOLIC BLOOD PRESSURE: 69 MMHG | BODY MASS INDEX: 17.74 KG/M2

## 2024-01-31 DIAGNOSIS — E55.9 VITAMIN D DEFICIENCY, UNSPECIFIED: ICD-10-CM

## 2024-01-31 DIAGNOSIS — E89.0 POSTPROCEDURAL HYPOTHYROIDISM: ICD-10-CM

## 2024-01-31 DIAGNOSIS — C73 MALIGNANT NEOPLASM OF THYROID GLAND: ICD-10-CM

## 2024-01-31 DIAGNOSIS — Z13.1 ENCOUNTER FOR SCREENING FOR DIABETES MELLITUS: ICD-10-CM

## 2024-01-31 PROCEDURE — 99214 OFFICE O/P EST MOD 30 MIN: CPT | Mod: 25

## 2024-01-31 RX ORDER — CHOLECALCIFEROL (VITAMIN D3) 50 MCG
50 MCG TABLET ORAL
Refills: 0 | Status: ACTIVE | COMMUNITY

## 2024-02-01 LAB
25(OH)D3 SERPL-MCNC: 37.1 NG/ML
ALBUMIN SERPL ELPH-MCNC: 4.5 G/DL
ALP BLD-CCNC: 69 U/L
ALT SERPL-CCNC: 35 U/L
ANION GAP SERPL CALC-SCNC: 12 MMOL/L
AST SERPL-CCNC: 34 U/L
BASOPHILS # BLD AUTO: 0.06 K/UL
BASOPHILS NFR BLD AUTO: 0.8 %
BILIRUB SERPL-MCNC: 0.2 MG/DL
BUN SERPL-MCNC: 27 MG/DL
CALCIUM SERPL-MCNC: 9.3 MG/DL
CALCIUM SERPL-MCNC: 9.3 MG/DL
CHLORIDE SERPL-SCNC: 103 MMOL/L
CHOLEST SERPL-MCNC: 173 MG/DL
CO2 SERPL-SCNC: 27 MMOL/L
CREAT SERPL-MCNC: 0.6 MG/DL
EGFR: 104 ML/MIN/1.73M2
EOSINOPHIL # BLD AUTO: 0.08 K/UL
EOSINOPHIL NFR BLD AUTO: 1.1 %
ESTIMATED AVERAGE GLUCOSE: 114 MG/DL
GLUCOSE SERPL-MCNC: 111 MG/DL
HBA1C MFR BLD HPLC: 5.6 %
HCT VFR BLD CALC: 41.3 %
HDLC SERPL-MCNC: 76 MG/DL
HGB BLD-MCNC: 12.6 G/DL
IMM GRANULOCYTES NFR BLD AUTO: 0.3 %
LDLC SERPL CALC-MCNC: 83 MG/DL
LYMPHOCYTES # BLD AUTO: 1.42 K/UL
LYMPHOCYTES NFR BLD AUTO: 20.1 %
MAGNESIUM SERPL-MCNC: 2 MG/DL
MAN DIFF?: NORMAL
MCHC RBC-ENTMCNC: 29.4 PG
MCHC RBC-ENTMCNC: 30.5 GM/DL
MCV RBC AUTO: 96.5 FL
MONOCYTES # BLD AUTO: 0.36 K/UL
MONOCYTES NFR BLD AUTO: 5.1 %
NEUTROPHILS # BLD AUTO: 5.12 K/UL
NEUTROPHILS NFR BLD AUTO: 72.6 %
NONHDLC SERPL-MCNC: 97 MG/DL
PARATHYROID HORMONE INTACT: 22 PG/ML
PHOSPHATE SERPL-MCNC: 3.9 MG/DL
PLATELET # BLD AUTO: 263 K/UL
POTASSIUM SERPL-SCNC: 4.7 MMOL/L
PROT SERPL-MCNC: 7.4 G/DL
RBC # BLD: 4.28 M/UL
RBC # FLD: 13 %
SODIUM SERPL-SCNC: 142 MMOL/L
TRIGL SERPL-MCNC: 70 MG/DL
TSH SERPL-ACNC: 0.23 UIU/ML
VIT B12 SERPL-MCNC: 866 PG/ML
WBC # FLD AUTO: 7.06 K/UL

## 2024-02-02 LAB
THYROGLOB AB SERPL-ACNC: <20 IU/ML
THYROGLOB SERPL-MCNC: <0.2 NG/ML

## 2024-02-02 RX ORDER — ROSUVASTATIN CALCIUM 5 MG/1
5 TABLET, FILM COATED ORAL
Qty: 90 | Refills: 3 | Status: ACTIVE | COMMUNITY
Start: 1900-01-01 | End: 1900-01-01

## 2024-03-21 ENCOUNTER — LABORATORY RESULT (OUTPATIENT)
Age: 59
End: 2024-03-21

## 2024-03-22 NOTE — HISTORY OF PRESENT ILLNESS
[FreeTextEntry1] : Ms. Marie is a 58 year-old woman with a history of thyroid cancer, hypocalcemia, and hyperlipidemia presenting for follow-up of her endocrine issues. I saw her for an initial visit in December 2021 and last in January 2023.   Thyroid cancer. Stage 1 due to age and absence of metastatic disease per her report; pathology not available.  She is status post total thyroidectomy in October 2017.  She was treated with radioactive iodine postoperatively, perhaps 30 mCi. Neck ultrasound monitoring in [place] has been without evidence of residual or recurrent disease per her report.  Thyroglobulin undetectable with negative antibodies in January 2023. Neck ultrasound in January 2023 without evidence of residual or recurrent disease She has been taking levothyroxine 75 mcg daily.  She takes levothyroxine before bed. No history of external beam radiation exposure. Mother with history of hyperthyroidism with subsequent postablation hypothyroidism. No family history of thyroid cancer or other endocrine tumors.  Postoperative hypocalcemia. She has been taking alfacalcidol 0.5 mcg daily since her thyroid surgery due to immediate postoperative hypocalcemia, approximately equivalent to calcitriol 0.25 mcg. She was not informed of a diagnosis of hypoparathyroidism. We discontinued active vitamin D therapy in December 2021 and serum calcium has remained within the normal range.  No history of kidney stone or fracture. She has yogurt, cottage cheese, and milk daily.  She is taking vitamin D 2000 intl units daily. No calcium supplements or multivitamin.  Hyperlipidemia. She has been taking rosuvastatin 5 mg daily since 2019. She is tolerating therapy well. No personal or family history of heart disease.  Interim History  Laboratory results from last visit as below. TSH below goal and recommended adjustment in levothyroxine to 75 mcg daily. We planned to monitor TSH after dose adjustment. Thyroglobulin undetectable with negative antibodies, which was reassuring. Blood urea nitrogen and phosphorus elevated; recommended hydration. Glucose not fasting. Vitamin D around goal; recommended 2000 intl units daily. Lipid panel and other test results within range. Neck ultrasound in January 2023 without evidence of residual or recurrent disease. She feels well today. No acute issues. Medical and surgical history, medications, allergies, social and family history reviewed and updated as needed.

## 2024-03-22 NOTE — PHYSICAL EXAM
[Alert] : alert [Healthy Appearance] : healthy appearance [No Acute Distress] : no acute distress [Normal Sclera/Conjunctiva] : normal sclera/conjunctiva [Normal Hearing] : hearing was normal [No Neck Mass] : no neck mass was observed [No LAD] : no lymphadenopathy [Supple] : the neck was supple [Well Healed Scar] : well healed scar [No Respiratory Distress] : no respiratory distress [No Stigmata of Cushings Syndrome] : no stigmata of Cushings Syndrome [Normal Gait] : normal gait [Normal Insight/Judgement] : insight and judgment were intact [Kyphosis] : no kyphosis present [Acanthosis Nigricans] : no acanthosis nigricans [de-identified] : no palpable thyroid tissue [de-identified] : no moon facies, no supraclavicular fat pads

## 2024-03-22 NOTE — ASSESSMENT
[FreeTextEntry1] : Thyroid cancer. Stage 1 due to age and absence of metastatic disease per her report; pathology not available. She is status post total thyroidectomy in October 2017. She was treated with radioactive iodine postoperatively, unknown dose. She has had an excellent response to therapy. Thyroglobulin undetectable with negative antibodies in January 2023. Neck ultrasound in January 2023 without evidence of residual or recurrent disease. We will defer further ultrasound monitoring unless thyroglobulin rises or there is clinical concern.  Consider levothyroxine 75 mcg daily pending TSH for goal 0.5-2.5 uIU/mL; she will need refills pending results Monitor thyroglobulin and antibodies  Hyperlipidemia. She has been taking rosuvastatin 5 mg daily and tolerating well. No personal or family history of heart disease. Continue rosuvastatin 5 mg daily pending lipid panel  Routine health. We will send laboratory evaluation as below.  Return to see me in 12 months or earlier as needed.

## 2024-03-22 NOTE — ADDENDUM
[FreeTextEntry1] : Recent laboratory results as below. Blood urea nitrogen elevated and recommend hydration. Glucose not fasting. TSH below goal and recommend adjustment in levothyroxine from 75 mcg daily to 75 mcg 6 days/week (average daily dose 64 mcg). We will plan to repeat TSH in 8-12 weeks. Thyroglobulin undetectable with negative antibodies. Calciotropic panel, HbA1c, lipid panel, and other test results within range. 2/02/24  Recent laboratory results as below; discussed with Ms. Marie. TSH within range on her current regimen of levothyroxine. 3/22/24

## 2024-05-28 ENCOUNTER — TRANSCRIPTION ENCOUNTER (OUTPATIENT)
Age: 59
End: 2024-05-28

## 2024-05-31 ENCOUNTER — RX RENEWAL (OUTPATIENT)
Age: 59
End: 2024-05-31

## 2024-05-31 RX ORDER — LEVOTHYROXINE SODIUM 0.07 MG/1
75 TABLET ORAL
Qty: 72 | Refills: 2 | Status: ACTIVE | COMMUNITY
Start: 1900-01-01 | End: 1900-01-01

## 2024-06-11 ENCOUNTER — NON-APPOINTMENT (OUTPATIENT)
Age: 59
End: 2024-06-11

## 2024-06-17 ENCOUNTER — APPOINTMENT (OUTPATIENT)
Dept: INTERNAL MEDICINE | Facility: CLINIC | Age: 59
End: 2024-06-17
Payer: MEDICAID

## 2024-06-17 VITALS
HEART RATE: 76 BPM | TEMPERATURE: 99.4 F | DIASTOLIC BLOOD PRESSURE: 64 MMHG | BODY MASS INDEX: 18.08 KG/M2 | WEIGHT: 98.25 LBS | OXYGEN SATURATION: 98 % | HEIGHT: 62 IN | SYSTOLIC BLOOD PRESSURE: 112 MMHG

## 2024-06-17 DIAGNOSIS — E78.5 HYPERLIPIDEMIA, UNSPECIFIED: ICD-10-CM

## 2024-06-17 DIAGNOSIS — R59.0 LOCALIZED ENLARGED LYMPH NODES: ICD-10-CM

## 2024-06-17 DIAGNOSIS — Q27.9 CONGENITAL MALFORMATION OF PERIPHERAL VASCULAR SYSTEM, UNSPECIFIED: ICD-10-CM

## 2024-06-17 DIAGNOSIS — R51.9 HEADACHE, UNSPECIFIED: ICD-10-CM

## 2024-06-17 PROCEDURE — 99214 OFFICE O/P EST MOD 30 MIN: CPT

## 2024-06-17 PROCEDURE — G2211 COMPLEX E/M VISIT ADD ON: CPT | Mod: NC

## 2024-06-17 NOTE — HISTORY OF PRESENT ILLNESS
[FreeTextEntry8] : right facial plain - at angle of jaw  - noted while traveling 4/2024 throughout [place], initial episode lasts few days, subsequent episodes were shorter lasted 1-2hrs - happed every time she traveled to new location while on vacation.  - h/o thyroid cancer s/p thyroidectomy 2017

## 2024-06-17 NOTE — PHYSICAL EXAM
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

## 2024-06-18 ENCOUNTER — APPOINTMENT (OUTPATIENT)
Dept: ULTRASOUND IMAGING | Facility: HOSPITAL | Age: 59
End: 2024-06-18

## 2024-06-18 ENCOUNTER — OUTPATIENT (OUTPATIENT)
Dept: OUTPATIENT SERVICES | Facility: HOSPITAL | Age: 59
LOS: 1 days | End: 2024-06-18
Payer: MEDICAID

## 2024-06-18 ENCOUNTER — TRANSCRIPTION ENCOUNTER (OUTPATIENT)
Age: 59
End: 2024-06-18

## 2024-06-18 PROCEDURE — 76536 US EXAM OF HEAD AND NECK: CPT | Mod: 26

## 2024-06-18 PROCEDURE — 76536 US EXAM OF HEAD AND NECK: CPT

## 2024-10-31 ENCOUNTER — APPOINTMENT (OUTPATIENT)
Dept: ENDOCRINOLOGY | Facility: CLINIC | Age: 59
End: 2024-10-31
Payer: MEDICAID

## 2024-10-31 VITALS
HEART RATE: 69 BPM | BODY MASS INDEX: 17.25 KG/M2 | DIASTOLIC BLOOD PRESSURE: 67 MMHG | SYSTOLIC BLOOD PRESSURE: 107 MMHG | WEIGHT: 94.31 LBS

## 2024-10-31 DIAGNOSIS — C73 MALIGNANT NEOPLASM OF THYROID GLAND: ICD-10-CM

## 2024-10-31 DIAGNOSIS — E89.0 POSTPROCEDURAL HYPOTHYROIDISM: ICD-10-CM

## 2024-10-31 DIAGNOSIS — E78.5 HYPERLIPIDEMIA, UNSPECIFIED: ICD-10-CM

## 2024-10-31 PROCEDURE — 99214 OFFICE O/P EST MOD 30 MIN: CPT

## 2024-10-31 PROCEDURE — G2211 COMPLEX E/M VISIT ADD ON: CPT | Mod: NC

## 2024-11-01 LAB
25(OH)D3 SERPL-MCNC: 48.4 NG/ML
ALBUMIN SERPL ELPH-MCNC: 4.6 G/DL
ALP BLD-CCNC: 72 U/L
ALT SERPL-CCNC: 30 U/L
ANION GAP SERPL CALC-SCNC: 13 MMOL/L
AST SERPL-CCNC: 40 U/L
BASOPHILS # BLD AUTO: 0.05 K/UL
BASOPHILS NFR BLD AUTO: 0.7 %
BILIRUB SERPL-MCNC: 0.3 MG/DL
BUN SERPL-MCNC: 21 MG/DL
CALCIUM SERPL-MCNC: 9.5 MG/DL
CALCIUM SERPL-MCNC: 9.5 MG/DL
CHLORIDE SERPL-SCNC: 100 MMOL/L
CHOLEST SERPL-MCNC: 212 MG/DL
CO2 SERPL-SCNC: 26 MMOL/L
CREAT SERPL-MCNC: 0.73 MG/DL
EGFR: 95 ML/MIN/1.73M2
EOSINOPHIL # BLD AUTO: 0.09 K/UL
EOSINOPHIL NFR BLD AUTO: 1.3 %
GLUCOSE SERPL-MCNC: 86 MG/DL
HCT VFR BLD CALC: 41.9 %
HDLC SERPL-MCNC: 87 MG/DL
HGB BLD-MCNC: 12.9 G/DL
IMM GRANULOCYTES NFR BLD AUTO: 0.3 %
LDLC SERPL CALC-MCNC: 113 MG/DL
LYMPHOCYTES # BLD AUTO: 1.96 K/UL
LYMPHOCYTES NFR BLD AUTO: 29.2 %
MAN DIFF?: NORMAL
MCHC RBC-ENTMCNC: 29.4 PG
MCHC RBC-ENTMCNC: 30.8 G/DL
MCV RBC AUTO: 95.4 FL
MONOCYTES # BLD AUTO: 0.34 K/UL
MONOCYTES NFR BLD AUTO: 5.1 %
NEUTROPHILS # BLD AUTO: 4.25 K/UL
NEUTROPHILS NFR BLD AUTO: 63.4 %
NONHDLC SERPL-MCNC: 126 MG/DL
PARATHYROID HORMONE INTACT: 29 PG/ML
PLATELET # BLD AUTO: 295 K/UL
POTASSIUM SERPL-SCNC: 4.3 MMOL/L
PROT SERPL-MCNC: 7.9 G/DL
RBC # BLD: 4.39 M/UL
RBC # FLD: 13.4 %
SODIUM SERPL-SCNC: 139 MMOL/L
THYROGLOB AB SERPL-ACNC: <15 IU/ML
THYROGLOB SERPL-MCNC: 0.24 NG/ML
TRIGL SERPL-MCNC: 72 MG/DL
TSH SERPL-ACNC: 1.56 UIU/ML
VIT B12 SERPL-MCNC: 1112 PG/ML
WBC # FLD AUTO: 6.71 K/UL

## 2025-01-15 ENCOUNTER — TRANSCRIPTION ENCOUNTER (OUTPATIENT)
Age: 60
End: 2025-01-15

## 2025-01-15 ENCOUNTER — RX RENEWAL (OUTPATIENT)
Age: 60
End: 2025-01-15

## 2025-09-08 ENCOUNTER — TRANSCRIPTION ENCOUNTER (OUTPATIENT)
Age: 60
End: 2025-09-08